# Patient Record
Sex: MALE | Race: WHITE | NOT HISPANIC OR LATINO | Employment: UNEMPLOYED | URBAN - METROPOLITAN AREA
[De-identification: names, ages, dates, MRNs, and addresses within clinical notes are randomized per-mention and may not be internally consistent; named-entity substitution may affect disease eponyms.]

---

## 2021-10-20 ENCOUNTER — HOSPITAL ENCOUNTER (EMERGENCY)
Facility: HOSPITAL | Age: 46
Discharge: HOME | End: 2021-10-20
Attending: EMERGENCY MEDICINE | Admitting: EMERGENCY MEDICINE

## 2021-10-20 VITALS
OXYGEN SATURATION: 97 % | DIASTOLIC BLOOD PRESSURE: 96 MMHG | HEART RATE: 68 BPM | TEMPERATURE: 97.3 F | RESPIRATION RATE: 18 BRPM | SYSTOLIC BLOOD PRESSURE: 130 MMHG

## 2021-10-20 DIAGNOSIS — U07.1 COVID-19 VIRUS DETECTED: Primary | ICD-10-CM

## 2021-10-20 PROCEDURE — 99283 EMERGENCY DEPT VISIT LOW MDM: CPT

## 2021-10-21 NOTE — DISCHARGE INSTRUCTIONS
Continue medications and care per [facility] discharge instructions.  Follow-up with your doctor in 1 to 2 weeks for recheck.  Return to the emergency room if worse or for oxygen saturations less than 90%.

## 2021-10-21 NOTE — CASE MANAGEMENT/SOCIAL WORK
Spoke with Mitchell Jamison (caregiver), to inform that patient is ready to be picked up from the ED. Mitchell states that he will come to pick the patient up now. PABLO ButcherN RN

## 2021-10-21 NOTE — ED TRIAGE NOTES
"Patient to ed with main complaints of a sore throat. Patient is Covid +. Patient has caregiver due to mental disability and apparently care giver would like patient to be seen to be \"admitted\" but unsure as to why. Patient was seen at [facility] yesterday and only complains of sore throat.     I wore full protective equipment throughout this patient encounter including a face mask, goggles, and gloves. Hand hygiene was performed before donning protective equipment and after removal when leaving the room.    "

## 2021-10-21 NOTE — CASE MANAGEMENT/SOCIAL WORK
"Spoke with patient at bedside regarding reasons for presenting to ED for evaluation this evening. Patient states, \"I'm fine tonight. I don't know. They called EMS from my home.\" I inquired who he was speaking about and he stated, \"Mitchell.\" I asked if I could speak with Mitchell and the patient called him on his cell phone and let me speak with him. Mitchell Jamison (caregiver) states that he is the patient's caregiver (though not legal guardian, as patient is a faith of the [facility]). He states that the patient has been seen at [facility] two times in the last week (10/17 and 10/19) and was discharged from the ER with diagnoses of gallstones, pulmonary nodules, and COVID-19 infection. Mitchell states that, last night, patient vomited and that he is still complaining of a sore throat. For these reasons, he stated, he wanted a \"second opinion.\" I explained to Mitchell that, per the ED evaluation here and review of electronic medical record,  there is no medical indication for admission to hospital, and the patient will be discharged to home tonight. Mitchell vocalizes understanding and states that he will come to pick the patient up from the ED upon discharge. KIESHA Butcher RN   "

## 2021-10-21 NOTE — ED PROVIDER NOTES
EMERGENCY DEPARTMENT ENCOUNTER    Room Number:  35/35  Date of encounter:  10/20/2021  PCP: Sergio Porras APRN  Historian: Patient and EMS      HPI:  Chief Complaint: Sore throat    A complete HPI/ROS/PMH/PSH/SH/FH are limited due to: Mental disability    Context: Roverto Sanon is a 46 y.o. male who presents to the ED via EMS from home for sore throat.  Per EMS the patient does have a mental disability and is currently staying with a friend.  The friend called EMS tonight because the patient tested Covid positive at [facility] and they want the patient admitted to the hospital.  The patient reports a mild sore throat at this point but otherwise denies complaints and states he feels much better.  He states he did have a cough several days ago that is nearly completely resolved.  He denies loss of taste, loss of smell, chest pain, abdominal pain, vomiting, diarrhea or focal neuro deficit      PAST MEDICAL HISTORY  Active Ambulatory Problems     Diagnosis Date Noted   • No Active Ambulatory Problems     Resolved Ambulatory Problems     Diagnosis Date Noted   • No Resolved Ambulatory Problems     No Additional Past Medical History         PAST SURGICAL HISTORY  No past surgical history on file.      FAMILY HISTORY  No family history on file.      SOCIAL HISTORY  Social History     Socioeconomic History   • Marital status: Single         ALLERGIES  Patient has no known allergies.        REVIEW OF SYSTEMS  Review of Systems       All systems reviewed and negative except for those discussed in HPI.     PHYSICAL EXAM    I have reviewed the triage vital signs and nursing notes.    ED Triage Vitals   Temp Heart Rate Resp BP SpO2   10/20/21 2054 10/20/21 2053 10/20/21 2053 10/20/21 2053 10/20/21 2053   97.3 °F (36.3 °C) 68 18 130/96 97 %      Temp src Heart Rate Source Patient Position BP Location FiO2 (%)   10/20/21 2054 -- -- -- --   Tympanic           GENERAL: 46-year-old well developed, well nourished in no acute  distress  HENT: NCAT, neck supple, trachea midline: No pharyngeal erythema, no exudates, no peritonsillar masses, no stridor, no hoarseness; the patient is able tolerate his secretions without difficulty  EYES: no scleral icterus, PERRL, normal conjunctivae  CV: regular rhythm, regular rate, no murmur  RESPIRATORY: unlabored effort, CTAB  ABDOMEN: soft, nontender, nondistended, bowel sounds present  MUSCULOSKELETAL: no gross deformity, no pedal edema, no calf tenderness  NEURO: alert,  sensory and motor function of extremities intact, speech clear, mental status baseline per EMS report  SKIN: warm, dry, no rash  PSYCH:  Appropriate mood and affect      PPE  Patient was placed in face mask in first look. Patient was wearing facemask when I entered the room and throughout our encounter. I wore full protective equipment throughout this patient encounter including a N95 face mask, eye shield, gown and gloves. Hand hygiene was performed before donning protective equipment and after removal when leaving the room.    Vital signs and nursing notes reviewed.      LAB RESULTS  No results found for this or any previous visit (from the past 24 hour(s)).    Ordered the above labs and independently reviewed the results.        RADIOLOGY  No Radiology Exams Resulted Within Past 24 Hours    I ordered the above noted radiological studies. Independently reviewed by me and discussed with radiologist.  See dictation above for official radiology interpretation.      PROCEDURES    Procedures        MEDICATIONS GIVEN IN ER    Medications - No data to display      PROGRESS, DATA ANALYSIS, CONSULTS, AND MEDICAL DECISION MAKING    All labs have been independently reviewed by me.  All radiology studies have been reviewed by me and discussed with radiologist dictating report.   EKG's independently reviewed by me.  Discussion below represents my analysis of pertinent findings related to patient's condition, differential diagnosis, treatment  plan and final disposition.      ED Course as of 10/20/21 2224   Wed Oct 20, 2021   2110 The patient has been seen at [facility] 2 out of the past 3 days.  He has tested positive for Covid.  The patient states that he is feeling better today however the friend he is living with called EMS for the patient to be brought to the hospital for admission.  Currently the patient complains of a sore throat which she was seen at [facility] for yesterday with a negative strep screen.  On exam the patient is in no acute distress with normal vital signs and room air oxygen saturation of 97%.  I do not believe the patient needs any further medical work-up at this time and does not meet criteria for admission.  I have asked our CCP nurse to evaluate the patient's living situation and help with disposition. [GP]   2154 Her CCP has spoken with the person in charge of the patient's group home.  He is advised him the patient is Covid positive but does not meet requirements for admission.  They will come pick the patient up and take him back home. [GP]      ED Course User Index  [GP] Javier Liriano MD             AS OF 22:24 EDT VITALS:    BP - 130/96  HR - 68  TEMP - 97.3 °F (36.3 °C) (Tympanic)  02 SATS - 97%        DIAGNOSIS  Final diagnoses:   COVID-19 virus detected         DISPOSITION  Discharged        EMR Dragon/Transcription disclaimer:   Much of this encounter note is an electronic transcription/translation of spoken language to printed text.        Javier Liriano MD  10/20/21 2225